# Patient Record
Sex: FEMALE | Race: WHITE | NOT HISPANIC OR LATINO | Employment: UNEMPLOYED | URBAN - METROPOLITAN AREA
[De-identification: names, ages, dates, MRNs, and addresses within clinical notes are randomized per-mention and may not be internally consistent; named-entity substitution may affect disease eponyms.]

---
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
## 2022-01-12 ENCOUNTER — HOSPITAL ENCOUNTER (EMERGENCY)
Facility: HOSPITAL | Age: 22
Discharge: LEFT BEFORE DISCHARGE | End: 2022-01-12

## 2022-01-12 VITALS
OXYGEN SATURATION: 99 % | RESPIRATION RATE: 18 BRPM | TEMPERATURE: 98.5 F | HEART RATE: 100 BPM | DIASTOLIC BLOOD PRESSURE: 60 MMHG | SYSTOLIC BLOOD PRESSURE: 117 MMHG

## 2022-01-12 PROCEDURE — 99211 OFF/OP EST MAY X REQ PHY/QHP: CPT

## 2022-01-12 NOTE — ED TRIAGE NOTES
To ER via EMS from home.c/o fever (101.5 at home) bodyaches, fatigue, vomiting since 2000.    Pt in mask at time of triage.  Triage staff in appropriate PPE.

## 2022-01-17 ENCOUNTER — TELEPHONE (OUTPATIENT)
Dept: OBSTETRICS AND GYNECOLOGY | Facility: CLINIC | Age: 22
End: 2022-01-17

## 2022-03-10 ENCOUNTER — TELEPHONE (OUTPATIENT)
Dept: OBSTETRICS AND GYNECOLOGY | Facility: CLINIC | Age: 22
End: 2022-03-10

## 2022-08-31 ENCOUNTER — OFFICE VISIT (OUTPATIENT)
Dept: OBSTETRICS AND GYNECOLOGY | Facility: CLINIC | Age: 22
End: 2022-08-31

## 2022-08-31 VITALS
HEIGHT: 62 IN | DIASTOLIC BLOOD PRESSURE: 68 MMHG | HEART RATE: 91 BPM | WEIGHT: 100.6 LBS | SYSTOLIC BLOOD PRESSURE: 95 MMHG | BODY MASS INDEX: 18.51 KG/M2

## 2022-08-31 DIAGNOSIS — Z01.419 ENCOUNTER FOR GYNECOLOGICAL EXAMINATION: Primary | ICD-10-CM

## 2022-08-31 DIAGNOSIS — Z11.3 SCREEN FOR STD (SEXUALLY TRANSMITTED DISEASE): ICD-10-CM

## 2022-08-31 PROCEDURE — 99395 PREV VISIT EST AGE 18-39: CPT | Performed by: OBSTETRICS & GYNECOLOGY

## 2022-08-31 PROCEDURE — 2014F MENTAL STATUS ASSESS: CPT | Performed by: OBSTETRICS & GYNECOLOGY

## 2022-08-31 PROCEDURE — 3008F BODY MASS INDEX DOCD: CPT | Performed by: OBSTETRICS & GYNECOLOGY

## 2022-08-31 NOTE — PROGRESS NOTES
"Chief Complaint  Annual Exam    Subjective        Huber Montemayor presents to [facility]  History of Present Illness  Patient is a 22-year-old that presents for gynecological exam.  She reports symptoms of bloating and breast tenderness at the end of her menstrual period and reports cramping prior to her menstrual period starting.  She reports that her and her partner have been trying to get pregnant.  She also reports that she \"easily gets STDs.\"  She is wanting STD screening.  Objective   Vital Signs:  BP 95/68   Pulse 91   Ht 157.5 cm (62\")   Wt 45.6 kg (100 lb 9.6 oz)   BMI 18.40 kg/m²   Estimated body mass index is 18.4 kg/m² as calculated from the following:    Height as of this encounter: 157.5 cm (62\").    Weight as of this encounter: 45.6 kg (100 lb 9.6 oz).          Physical Exam  Vitals reviewed. Exam conducted with a chaperone present.   Constitutional:       Appearance: She is well-developed.   Cardiovascular:      Rate and Rhythm: Normal rate and regular rhythm.   Pulmonary:      Effort: Pulmonary effort is normal.      Breath sounds: Normal breath sounds.   Chest:   Breasts:      Right: No inverted nipple, mass, nipple discharge, skin change or tenderness.      Left: No inverted nipple, mass, nipple discharge, skin change or tenderness.       Abdominal:      General: There is no distension.      Palpations: Abdomen is soft.      Tenderness: There is no abdominal tenderness.   Genitourinary:     Labia:         Right: No rash, tenderness, lesion or injury.         Left: No rash, tenderness, lesion or injury.       Vagina: Normal.      Cervix: Normal.      Uterus: Normal.       Adnexa:         Right: No mass, tenderness or fullness.          Left: No mass, tenderness or fullness.     Neurological:      Mental Status: She is alert.        Result Review :                Assessment and Plan   Diagnoses and all orders for this visit:    1. Encounter for " gynecological examination (Primary)    2. Screen for STD (sexually transmitted disease)  -     RPR, Rfx Qn RPR / Confirm TP  -     Hepatitis B Surface Antigen  -     Hepatitis C Antibody  -     HIV-1 / O / 2 Ag / Antibody 4th Generation  -     Chlamydia trachomatis, Neisseria gonorrhoeae, Trichomonas vaginalis, PCR - Swab, Vagina      She was counseled on monthly self breast exams for breast health.  She was counseled on tracking her menstrual cycles and when ovulation occurs.  She may follow-up in 1 year for gynecological exam or sooner if needed.       Follow Up   Return in about 1 year (around 8/31/2023) for gynecological exam.  Patient was given instructions and counseling regarding her condition or for health maintenance advice. Please see specific information pulled into the AVS if appropriate.

## 2022-09-01 LAB
HBV SURFACE AG SERPL QL IA: NEGATIVE
HCV AB S/CO SERPL IA: <0.1 S/CO RATIO (ref 0–0.9)
HIV 1+2 AB+HIV1 P24 AG SERPL QL IA: NON REACTIVE
RPR SER QL: NON REACTIVE

## 2022-09-02 LAB
C TRACH RRNA SPEC QL NAA+PROBE: NEGATIVE
N GONORRHOEA RRNA SPEC QL NAA+PROBE: NEGATIVE
T VAGINALIS RRNA SPEC QL NAA+PROBE: NEGATIVE

## 2022-09-07 ENCOUNTER — TELEPHONE (OUTPATIENT)
Dept: OBSTETRICS AND GYNECOLOGY | Facility: CLINIC | Age: 22
End: 2022-09-07

## 2022-09-07 NOTE — TELEPHONE ENCOUNTER
----- Message from Dee Don MD sent at 9/7/2022  8:58 AM EDT -----  Let patient know her STD testing was negative.

## 2022-11-30 ENCOUNTER — TELEPHONE (OUTPATIENT)
Dept: OBSTETRICS AND GYNECOLOGY | Facility: CLINIC | Age: 22
End: 2022-11-30

## 2022-12-28 ENCOUNTER — TELEPHONE (OUTPATIENT)
Dept: OBSTETRICS AND GYNECOLOGY | Facility: CLINIC | Age: 22
End: 2022-12-28

## 2022-12-28 RX ORDER — FLUCONAZOLE 150 MG/1
150 TABLET ORAL DAILY
Qty: 1 TABLET | Refills: 0 | Status: SHIPPED | OUTPATIENT
Start: 2022-12-28

## 2024-03-27 NOTE — H&P
[facility]  Obstetric History and Physical    Chief Complaint   Patient presents with   • Contractions     arrived to [facility] with complaints of contractions that started at 7pm, that were 4-5 minutes for 2 1/2 hours.  Baby active, denies vaginal bleeding, denies rupture of membranes            Patient is a 21 y.o. female  currently at 38w4d, who presents with regular contractions which are painful and causing cervical change.  Patient was admitted through the OB emergency department..    Her prenatal care was with Dr. Don and was complicated by maternal mild anemia, heart arrhythmia and small for gestational age baby with estimated fetal weight at the 14th percentile on 2021.  She is reported normal fetal movement of late.       External Prenatal Results     Pregnancy Outside Results - Transcribed From Office Records - See Scanned Records For Details     Test Value Date Time    Hgb 12.2 g/dL 21 0315      11.9 g/dL 21 0641      10.8 g/dL 20 1557      13.3 g/dL 20 1820      12.6 g/dL 20 0957    Hct 37.4 % 21 0315      34.6 % 21 0641      31.8 % 20 1557      39.6 % 20 1820      39.1 % 20 0957    ABO A  21 031    Rh Positive  21 0315    Antibody Screen Negative  21 0315      Negative  21 0648      Negative  20 0957    Glucose Fasting GTT       Glucose Tolerance Test 1 hour       Glucose Tolerance Test 3 hour       Gonorrhea (discrete) Negative  21 0837      Negative  20 1633      Negative  08/10/20 1124      Negative  20 1220    Chlamydia (discrete) Negative  21 0837      Negative  20 1633      Positive  08/10/20 1124      Positive  20 1220    RPR Non Reactive  20 0957    VDRL       Syphilis Antibody <0.2 AI 19 1602    Rubella 2.27 index 20 0957    HBsAg Negative  20 0957    Herpes Simplex Virus PCR       Herpes Simplex VIrus Culture       HIV Nonreactive   20 1820      Non Reactive  20 0957    Hep C RNA Quant PCR       Hep C Antibody <0.1 s/co ratio 20 0957    AFP 63.7 ng/mL 10/12/20 1607    Group B Strep Negative  21 0328    GBS Susceptibility to Clindamycin       GBS Susceptibility to Erythromycin       Fetal Fibronectin Negative  21 0615    Genetic Testing, Maternal Blood             Drug Screening     Test Value Date Time    Urine Drug Screen       Amphetamine Screen Negative ng/mL 20 1214    Barbiturate Screen Negative  21 0814      Negative  21 0610      Negative ng/mL 20 1214    Benzodiazepine Screen Negative  21 0814      Negative  21 0610      Negative ng/mL 20 1214    Methadone Screen Negative  21 0814      Negative  21 0610      Negative ng/mL 20 1214    Phencyclidine Screen Negative ng/mL 20 1214    Opiates Screen Negative  21 0814      Negative  21 0610    THC Screen Negative  21 0814      Negative  21 0610    Cocaine Screen       Propoxyphene Screen Negative ng/mL 20 1214    Buprenorphine Screen       Methamphetamine Screen       Oxycodone Screen Negative  21 0814      Negative  21 0610    Tricyclic Antidepressants Screen                      OB History    Para Term  AB Living   1 0 0 0 0 0   SAB TAB Ectopic Molar Multiple Live Births   0 0 0 0 0 0      # Outcome Date GA Lbr Paul/2nd Weight Sex Delivery Anes PTL Lv   1 Current                    Past Medical History:   Diagnosis Date   • Anemia    • Chlamydia     2020   • Gastroesophageal reflux disease    • Premature infant of 28 weeks gestation           Past Surgical History:   Procedure Laterality Date   • TUMOR REMOVAL     • WISDOM TOOTH EXTRACTION            No current facility-administered medications on file prior to encounter.      Current Outpatient Medications on File Prior to Encounter   Medication Sig Dispense Refill   • omeprazole (priLOSEC)  "20 MG capsule Take 1 capsule by mouth Daily. 90 capsule 0   • ferrous sulfate 325 (65 FE) MG tablet Take 1 tablet by mouth Daily With Breakfast. 90 tablet 1   • Prenatal Vit-Fe Fumarate-FA (prenatal vitamin 28-0.8) 28-0.8 MG tablet tablet      • promethazine (PHENERGAN) 25 MG tablet Take 1 tablet by mouth Every 6 (Six) Hours As Needed for Nausea or Vomiting. 30 tablet 0          Allergies   Allergen Reactions   • Latex Rash          Social History     Socioeconomic History   • Marital status: Single     Spouse name: Not on file   • Number of children: Not on file   • Years of education: Not on file   • Highest education level: Not on file   Tobacco Use   • Smoking status: Never Smoker   • Smokeless tobacco: Never Used   Substance and Sexual Activity   • Alcohol use: No   • Drug use: No   • Sexual activity: Yes          Family History   Problem Relation Age of Onset   • Colon cancer Maternal Grandmother    • Heart disease Mother    • Hypertension Mother    • Cancer Mother    • No Known Problems Father    • Asthma Brother         Review of Systems   Gastrointestinal: Positive for abdominal pain.   Genitourinary: Positive for pelvic pain and pelvic pressure. Negative for vaginal bleeding.   All other systems reviewed and are negative.      /88   Pulse 106   Temp 98.2 °F (36.8 °C) (Oral)   Resp (P) 16   Ht 157.5 cm (62\")   Wt 61.5 kg (135 lb 9.6 oz)   LMP 06/08/2020 (Exact Date)   SpO2 99%   Breastfeeding Yes Comment: Pumping  BMI 24.80 kg/m²     Physical Exam  Vitals signs reviewed.   Constitutional:       Appearance: She is normal weight.   HENT:      Head: Normocephalic.      Nose: Nose normal.      Mouth/Throat:      Mouth: Mucous membranes are moist.   Eyes:      Extraocular Movements: Extraocular movements intact.      Pupils: Pupils are equal, round, and reactive to light.   Neck:      Musculoskeletal: Normal range of motion.   Cardiovascular:      Rate and Rhythm: Normal rate and regular rhythm. "   Pulmonary:      Effort: Pulmonary effort is normal.      Breath sounds: Normal breath sounds.   Abdominal:      General: There is distension.      Comments: Gravid uterus consistent with gestational age   Genitourinary:     Comments: Cervix 2 to 3 cm dilated  Musculoskeletal: Normal range of motion.   Skin:     General: Skin is warm.   Neurological:      Mental Status: She is alert.   Psychiatric:         Mood and Affect: Mood normal.         Behavior: Behavior is cooperative.           FHT -category 1 tracing  [place] -contractions every 3 to 4 minutes    Lab Results   Component Value Date    WBC 9.59 03/05/2021    HGB 12.2 03/05/2021    HCT 37.4 03/05/2021    MCV 88.0 03/05/2021     03/05/2021         Normal labor and delivery      Assessment:  1.  Intrauterine pregnancy at 38w4d weeks gestation with reactive, reassuring fetal status.    2.  labor  without ROM  3.  Obstetrical history significant for is remarkable for Fetal growth at 14th percentile.  4.  GBS status: .  Negative    Plan:  1. Vaginal anticipated, labor augmentation  Pitocin and analgesia with  epidural  2. Plan of care has been reviewed with patient and her mother who are in agreement  3.  Risks, benefits of treatment plan have been discussed.  4.  All questions have been answered.        Luis Alfredo Fitch MD  3/5/2021  15:14 EST